# Patient Record
Sex: FEMALE | Race: BLACK OR AFRICAN AMERICAN
[De-identification: names, ages, dates, MRNs, and addresses within clinical notes are randomized per-mention and may not be internally consistent; named-entity substitution may affect disease eponyms.]

---

## 2019-10-24 ENCOUNTER — HOSPITAL ENCOUNTER (INPATIENT)
Dept: HOSPITAL 74 - JLDR | Age: 39
LOS: 3 days | Discharge: HOME | End: 2019-10-27
Attending: OBSTETRICS & GYNECOLOGY | Admitting: OBSTETRICS & GYNECOLOGY
Payer: COMMERCIAL

## 2019-10-24 VITALS — BODY MASS INDEX: 14.6 KG/M2

## 2019-10-24 DIAGNOSIS — O36.63X0: ICD-10-CM

## 2019-10-24 DIAGNOSIS — O40.3XX0: ICD-10-CM

## 2019-10-24 DIAGNOSIS — Z3A.39: ICD-10-CM

## 2019-10-24 DIAGNOSIS — O34.211: Primary | ICD-10-CM

## 2019-10-24 DIAGNOSIS — E66.01: ICD-10-CM

## 2019-10-24 RX ADMIN — IBUPROFEN PRN MG: 800 INJECTION INTRAVENOUS at 22:02

## 2019-10-24 NOTE — HP
Past Medical History





- Admission


Chief Complaint: repeat lt c s


History Source: Patient


Limitations to Obtaining History: No Limitations





- Past Medical History


CNS: No: Alzheimer's, CVA, Dementia, Migraine, Multiple Sclerosis, Peripheral 

Neuropathy, Parkinson's, Seizure, Syncope, TIA, Vertigo, Other


Cardiovascular: No: AFIB, Aneurysm, Aortic Insufficiency, Aortic Stenosis, CAD, 

CHF, Deep Vein Thrombosis, HTN, Hyperlipdemia, MI, Mitral Insufficiency, Mitral 

Stenosis, Murmur, Pulmonary Hypertension, Other


Pulmonary: No: Asthma, Bronchitis, Cancer, COPD, O2 Dependent, Pneumonia, 

Previously Intubated, Pulmonary Embolus, Pulmonary Fibrosis, Sleep Apnea, Other


Gastrointestinal: No: Ascites, Cancer, Constipation, Crohn's Disease, 

Diverticulitis, Diverticulosis, Esophageal Varices, Gastritis, GERD, GI Bleed, 

Hemorrhoids, Hiatal Hernia, Inflamatory Bowel Disease, Irritable Bowel Disease, 

Pancreatitis, Peptic Ulcer Disease, Ulcerative Colitis, Other


Hepatobiliary: No: Cirrhosis, Cholelithiasis, Cholecystitis, Choledocholithiasis

, Hepatitis A, Hepatitis B, Hepatitis C, Other


Renal/: No: Renal Failure, Renal Inusuff, BPH, Cancer, Hematuria, Hemodialysis

, Neurogenic Bladder, Renal Calculi, UTI, Other


Reproductive: No: Ectopic Pregnancy, Endometriosis, Fibroids, PID, Polycystic 

Ovary Syndrome, Postmenopausal, Other


...: 3


...Para: 2


...Term: 2


...: 0


...Spon : 0


...Induced : 0


...Multiple Gestation: 0


...LMP: 19


... Weeks Gestation by Dates: 39


...EDC by Dates: 10/30/19


...EDC by Sono: 10/24/19


Heme/Onc: No: Anemia, B12 Deficiency, Bleeding Disorder, Cancer, Current 

Chemotherapy, Current Radiation Therapy, Hemochromatosis, Hypercoaguable State, 

Myeloproliferative Synd, Sickle Cell Disease, Sickle Cell Trait, 

Thrombocytopenia, Other


Infectious Disease: No: AIDS, C-Diff, Herpes Zoster, HIV, MRSA, STD's, 

Tuberculosis, VREF, Other


Psych: No: Addictions, Anxiety, Bipolar, Depression, Panic, Psychosis, 

Schizophrenia, Other


Musculoskeletal: No: Bursitis, Chronic low back pain, Hemiparesis, Hemiplegia, 

Osteoarthritis, Paraplegia, Other


Rheumatology: No: Fibromyalgia, Gout, Lupus, Rheumatoid Arthritis, Sarcoidosis, 

Vasculitis, Other


ENT: No: Allergic Rhinitis, Sinusitis, Other


Endocrine: No: Mason's Disease, Cushing's Disease, Diabetes Insipidus, 

Diabetes Mellitus, Hyperparathyroidism, Hyperthyroidism, Hypothyroidism, 

Osteopenia, SIADH, Other


Dermatology: No: Basal Cell, Cellulitis, Eczema, Melanoma, Psoriasis, Squamous 

Cell, Other





- Past Surgical History


Past Surgical History: No: None, AAA Repair, AICD, Amputation, Appendectomy, 

Arthrosocopy, AV Fistula/Graft, Bariatric Surgery, Breast Biopsy, Bypass, CABG, 

Carotid Endarterectomy, Cataract Removal, Cholecystectomy, Colectomy, 

Colonoscopy, Colostomy, Craniotomy, , Cystectomy, Hernia Repair, 

Hysterectomy, Ileal Conduit, Ileosotomy, Joint Replacement, Kidney Transplant, 

Laminectomy, Liver Transplant, Mastectomy, Nephrectomy, Oopherectomy, 

Orchiectomy, Permanent Pacemaker, Prostatectomy, Splenectomy, Stent, Thoracotomy

, TURP, Tonsillectomy, Tubal Ligation, Upper Endoscopy, Valve Replacement, 

Vasectomy, Vein Stripping/Ligation


Hx Myomectomy: No


Hx Transabdominal Cerclage: No





- Advance Directives


Advance Directives: Yes: Living Will





- Smoking History


Smoking history: Never smoked


Have you smoked in the past 12 months: No





- Alcohol/Substance Use


Hx Alcohol Use: No


History of Substance Use: reports: None





- Social History


Usual Living Arrangement: Yes: With Spouse


Do you think of yourself as: Straight/Heterosexual


ADL: Independent


History of Recent Travel: No





Home Medications





- Allergies


Allergies/Adverse Reactions: 


 Allergies











Allergy/AdvReac Type Severity Reaction Status Date / Time


 


No Known Allergies Allergy   Verified 10/24/19 08:31














- Home Medications


Home Medications: 


Ambulatory Orders





Albuterol Sulfate [Proventil HFA Inhaler -] 1 - 2 inh PO PRN 10/24/19 


Prenat 115/Iron Fum/Folic/Dss [Prenatal 19 Tablet] 1 each PO DAILY 10/24/19 











Family Medical History


Family History: Denies





Review of Systems





- Review of Systems


Constitutional: reports: No Symptoms


Eyes: reports: No Symptoms


HENT: reports: No Symptoms


Neck: reports: No Symptoms


Cardiovascular: reports: No Symptoms


Respiratory: reports: No Symptoms


Gastrointestinal: reports: No Symptoms


Genitourinary: reports: No Symptoms


Breasts: reports: No Symptoms Reported


Musculoskeletal: reports: No Symptoms


Integumentary: reports: No Symptoms


Neurological: reports: No Symptoms


Endocrine: reports: No Symptoms


Hematology/Lymphatic: reports: No Symptoms


Psychiatric: reports: No Symptoms





Physical Exam - Maternity


Vital Signs: 


 Vital Signs











Temperature  98.7 F   10/24/19 08:34


 


Pulse Rate  103 H  10/24/19 08:34


 


Respiratory Rate  20   10/24/19 08:34


 


Blood Pressure  109/61   10/24/19 08:34


 


O2 Sat by Pulse Oximetry (%)      











Constitutional: Yes: Well Nourished, No Distress, Calm


Eyes: Yes: WNL, Conjunctiva Clear, EOM Intact


HENT: Yes: WNL, Atraumatic, Normocephalic


Neck: Yes: WNL, Supple, Trachea Midline


Cardiovascular: Yes: WNL, Regular Rate and Rhythm


Lungs: Clear to auscultation


Breast(s): Yes: WNL





- Abdominal Exam/OB


Fundal Height: 40


Number of Fetuses: Single


Fetal Presentation: Vertex


Contractions: Yes


Regularity: Irregular


Intensity: Mild


Fetal Monitor Mode: External


Fetal Heart Rate Location: [facility]


Category: I


Accelerations: Uniform


Decelerations: None





- Vaginal Exam/OB


Vaginal Bleediing: No


Speculum Exam: No


Amniotic Membrane Status: Intact


Fetal Presentation: Vertex/Position


Fetal Station: -3





- Physical Exam


Musculoskeletal: Yes: WNL


Extremities: Yes: WNL


Edema: Yes


Edema: LUE: 1+, RUE: 1+, LLE: 1+, RLE: 1+


Integumentary: Yes: WNL


Deep Tendon Reflex Grade: Normal +2


...Motor Strength: WNL


Psychiatric: Yes: WNL, Alert, Oriented





Assessment/Plan





for repeat lt c s

## 2019-10-24 NOTE — OP
Operative Note





- Note:


Operative Date: 10/24/19


Pre-Operative Diagnosis: repeat lt c s


Operation: repeat lt c s


Findings: 





polyhydramnious , macrosomia 


Post-Operative Diagnosis: Same as Pre-op


Surgeon: Migel Zhao


Assistant: Tomasz Santos


Anesthesia: Spinal


Estimated Blood Loss (mls): 1,200


Operative Report Dictated: Yes

## 2019-10-25 LAB
BASOPHILS # BLD: 0.7 % (ref 0–2)
DEPRECATED RDW RBC AUTO: 14.2 % (ref 11.6–15.6)
EOSINOPHIL # BLD: 1.4 % (ref 0–4.5)
HCT VFR BLD CALC: 30.9 % (ref 32.4–45.2)
HGB BLD-MCNC: 10.5 GM/DL (ref 10.7–15.3)
LYMPHOCYTES # BLD: 19.2 % (ref 8–40)
MCH RBC QN AUTO: 30 PG (ref 25.7–33.7)
MCHC RBC AUTO-ENTMCNC: 34 G/DL (ref 32–36)
MCV RBC: 88.4 FL (ref 80–96)
MONOCYTES # BLD AUTO: 8.9 % (ref 3.8–10.2)
NEUTROPHILS # BLD: 69.8 % (ref 42.8–82.8)
PLATELET # BLD AUTO: 158 K/MM3 (ref 134–434)
PMV BLD: 9.6 FL (ref 7.5–11.1)
RBC # BLD AUTO: 3.5 M/MM3 (ref 3.6–5.2)
WBC # BLD AUTO: 10.6 K/MM3 (ref 4–10)

## 2019-10-25 RX ADMIN — IBUPROFEN PRN MG: 600 TABLET, FILM COATED ORAL at 19:24

## 2019-10-25 RX ADMIN — SIMETHICONE CHEW TAB 80 MG PRN MG: 80 TABLET ORAL at 18:23

## 2019-10-25 RX ADMIN — IBUPROFEN PRN MG: 800 INJECTION INTRAVENOUS at 13:39

## 2019-10-25 RX ADMIN — Medication SCH MLS/HR: at 02:04

## 2019-10-25 RX ADMIN — ENOXAPARIN SODIUM SCH MG: 40 INJECTION SUBCUTANEOUS at 09:00

## 2019-10-25 RX ADMIN — IBUPROFEN PRN MG: 800 INJECTION INTRAVENOUS at 06:12

## 2019-10-25 RX ADMIN — Medication SCH MLS/HR: at 02:06

## 2019-10-25 NOTE — PN
Post Partum Progress Note


Post Partum Day: 1


Type of Delivery: Repeat C/S


Vital Signs: 


 Vital Signs











Temperature  98.5 F   10/25/19 10:00


 


Pulse Rate  95 H  10/25/19 10:00


 


Respiratory Rate  20   10/25/19 15:00


 


Blood Pressure  121/56 L  10/25/19 10:00


 


O2 Sat by Pulse Oximetry (%)  98   10/24/19 13:30











Breast Exam: Yes: Soft


Uterus: Yes: Fundus Firm, Fundus below umbilicus


Incision: Yes: Dressing dry and intact, Sutures intact


Abdomen/GI: Yes: Abdomen soft, Passing flatus, Tolerating PO


Lochia: Yes: Serosa


Lochia, amount: Small


Extremities: Yes: Calves non-tender


Perineum: Yes: Intact


Activity: Ambulating





- Labs


Labs: 


 CBC











WBC  10.6 K/mm3 (4.0-10.0)  H  10/25/19  08:42    


 


RBC  3.50 M/mm3 (3.60-5.2)  L  10/25/19  08:42    


 


Hgb  10.5 GM/dL (10.7-15.3)  L  10/25/19  08:42    


 


Hct  30.9 % (32.4-45.2)  L  10/25/19  08:42    


 


MCV  88.4 fl (80-96)   10/25/19  08:42    


 


MCH  30.0 pg (25.7-33.7)   10/25/19  08:42    


 


MCHC  34.0 g/dl (32.0-36.0)   10/25/19  08:42    


 


RDW  14.2 % (11.6-15.6)   10/25/19  08:42    


 


Plt Count  158 K/MM3 (134-434)   10/25/19  08:42    


 


MPV  9.6 fl (7.5-11.1)   10/25/19  08:42    


 


Absolute Neuts (auto)  7.4 K/mm3 (1.5-8.0)   10/25/19  08:42    


 


Neutrophils %  69.8 % (42.8-82.8)   10/25/19  08:42    


 


Lymphocytes %  19.2 % (8-40)  D 10/25/19  08:42    


 


Monocytes %  8.9 % (3.8-10.2)   10/25/19  08:42    


 


Eosinophils %  1.4 % (0-4.5)   10/25/19  08:42    


 


Basophils %  0.7 % (0-2.0)   10/25/19  08:42    


 


Nucleated RBC %  0 % (0-0)   10/25/19  08:42    














Assessment/Plan





doing well, oob as much as possible

## 2019-10-25 NOTE — OP
DATE OF OPERATION:  10/24/2019

 

PREOPERATIVE DIAGNOSIS:  Repeat low transverse  section. 

 

POSTOPERATIVE DIAGNOSIS:  Repeat low transverse  section.

 

PROCEDURE PERFORMED:  Repeat low transverse  section.

 

SURGEON:  Migel Zhao MD

 

ASSISTANT:  HANANE Echeverria 

 

ANESTHESIA:  Spinal.

 

ANESTHESIOLOGIST:  Sherry Samson M.D. 

 

INDICATIONS:  A 39-year-old female patient, 39 weeks' pregnant, is not diabetic.  She

is morbidly obese, with BMI of 44.2, and was taken to the OR repeat low transverse

 section.  The patient had a previous low transverse  section. 

Currently 39 weeks pregnant. 

 

DESCRIPTION OF PROCEDURE:  The patient was placed on the operating table in the

supine position after spinal anesthesia was obtained.  The patient's abdomen and

pelvis were prepped and draped in the usual sterile manner.  Following the same

Pfannenstiel incision, an incision was made through the skin and subcutaneous tissue

until the fascia was nicked in the midline.  The fascial incision was extended

bilaterally.  The intraperitoneal cavity was entered.  No bladder flap was created. 

The low transverse segment of the uterus was entered.

 

The baby was delivered from LOP position.  There was no cord around the neck.  The

baby is macrosomic and difficult to remove outside the uterine cavity, but we got it

out.   

 

The uterus was a little bit lethargic, so Pitocin was given and Methergine was given

for atonic uterus, but the fundus of the uterus was firm after the Methergine was

given.  The uterus was closed in a single layer, good hemostasis, no complications. 

Both ovaries, fallopian tubes and uterus within normal limits.  Peritoneum was

closed.  Fascia was closed.  Skin was closed with a subcuticular suture.  Both

gutters were clean.  Draining clear urine.  Blood loss about 1200 mL.  

 

Transferred to the recovery room in stable condition. 

 

 

MD CAILIN ARCE/[id number]

DD: 10/24/2019 21:43

DT: 10/25/2019 07:00

Job #:  [id number]

## 2019-10-26 RX ADMIN — ACETAMINOPHEN PRN MG: 325 TABLET ORAL at 13:27

## 2019-10-26 RX ADMIN — IBUPROFEN PRN MG: 600 TABLET, FILM COATED ORAL at 13:26

## 2019-10-26 RX ADMIN — IBUPROFEN PRN MG: 600 TABLET, FILM COATED ORAL at 20:22

## 2019-10-26 RX ADMIN — IBUPROFEN PRN MG: 600 TABLET, FILM COATED ORAL at 03:48

## 2019-10-26 RX ADMIN — SIMETHICONE CHEW TAB 80 MG PRN MG: 80 TABLET ORAL at 13:27

## 2019-10-26 RX ADMIN — SIMETHICONE CHEW TAB 80 MG PRN MG: 80 TABLET ORAL at 20:22

## 2019-10-26 RX ADMIN — IBUPROFEN PRN MG: 600 TABLET, FILM COATED ORAL at 09:20

## 2019-10-26 RX ADMIN — ENOXAPARIN SODIUM SCH MG: 40 INJECTION SUBCUTANEOUS at 09:20

## 2019-10-26 RX ADMIN — ACETAMINOPHEN PRN MG: 325 TABLET ORAL at 20:22

## 2019-10-26 RX ADMIN — SIMETHICONE CHEW TAB 80 MG PRN MG: 80 TABLET ORAL at 03:48

## 2019-10-26 RX ADMIN — SIMETHICONE CHEW TAB 80 MG PRN MG: 80 TABLET ORAL at 09:20

## 2019-10-27 VITALS — SYSTOLIC BLOOD PRESSURE: 125 MMHG | HEART RATE: 96 BPM | TEMPERATURE: 99.1 F | DIASTOLIC BLOOD PRESSURE: 69 MMHG

## 2019-10-27 LAB
BASOPHILS # BLD: 0.8 % (ref 0–2)
DEPRECATED RDW RBC AUTO: 14.7 % (ref 11.6–15.6)
EOSINOPHIL # BLD: 2.6 % (ref 0–4.5)
HCT VFR BLD CALC: 26.9 % (ref 32.4–45.2)
HGB BLD-MCNC: 9 GM/DL (ref 10.7–15.3)
LYMPHOCYTES # BLD: 34.6 % (ref 8–40)
MCH RBC QN AUTO: 29.8 PG (ref 25.7–33.7)
MCHC RBC AUTO-ENTMCNC: 33.6 G/DL (ref 32–36)
MCV RBC: 88.8 FL (ref 80–96)
MONOCYTES # BLD AUTO: 10.3 % (ref 3.8–10.2)
NEUTROPHILS # BLD: 51.7 % (ref 42.8–82.8)
PLATELET # BLD AUTO: 167 K/MM3 (ref 134–434)
PMV BLD: 9.8 FL (ref 7.5–11.1)
RBC # BLD AUTO: 3.03 M/MM3 (ref 3.6–5.2)
WBC # BLD AUTO: 5.2 K/MM3 (ref 4–10)

## 2019-10-27 RX ADMIN — SIMETHICONE CHEW TAB 80 MG PRN MG: 80 TABLET ORAL at 09:08

## 2019-10-27 RX ADMIN — ACETAMINOPHEN PRN MG: 325 TABLET ORAL at 09:06

## 2019-10-27 RX ADMIN — IBUPROFEN PRN MG: 600 TABLET, FILM COATED ORAL at 09:07

## 2019-10-27 RX ADMIN — ENOXAPARIN SODIUM SCH MG: 40 INJECTION SUBCUTANEOUS at 09:07

## 2019-10-27 NOTE — PN
Post Partum Progress Note


Post Partum Day: 3


Type of Delivery: Repeat C/S


Vital Signs: 


 Vital Signs











Temperature  99.1 F   10/27/19 10:00


 


Pulse Rate  96 H  10/27/19 10:00


 


Respiratory Rate  20   10/27/19 10:00


 


Blood Pressure  125/69   10/27/19 10:00


 


O2 Sat by Pulse Oximetry (%)  98   10/24/19 13:30











Breast Exam: Yes: Soft


Uterus: Yes: Fundus Firm, Fundus below umbilicus


Incision: Yes: Dressing dry and intact, Sutures intact


Abdomen/GI: Yes: Abdomen soft, Passing flatus, Tolerating PO


Lochia: Yes: Serosa


Lochia, amount: Small


Extremities: Yes: Calves non-tender


Perineum: Yes: Intact


Activity: Ambulating





- Labs


Labs: 


 CBC











WBC  5.2 K/mm3 (4.0-10.0)   10/27/19  07:23    


 


RBC  3.03 M/mm3 (3.60-5.2)  L  10/27/19  07:23    


 


Hgb  9.0 GM/dL (10.7-15.3)  L  10/27/19  07:23    


 


Hct  26.9 % (32.4-45.2)  L  10/27/19  07:23    


 


MCV  88.8 fl (80-96)   10/27/19  07:23    


 


MCH  29.8 pg (25.7-33.7)   10/27/19  07:23    


 


MCHC  33.6 g/dl (32.0-36.0)   10/27/19  07:23    


 


RDW  14.7 % (11.6-15.6)   10/27/19  07:23    


 


Plt Count  167 K/MM3 (134-434)   10/27/19  07:23    


 


MPV  9.8 fl (7.5-11.1)   10/27/19  07:23    


 


Absolute Neuts (auto)  2.7 K/mm3 (1.5-8.0)   10/27/19  07:23    


 


Neutrophils %  51.7 % (42.8-82.8)  D 10/27/19  07:23    


 


Lymphocytes %  34.6 % (8-40)  D 10/27/19  07:23    


 


Monocytes %  10.3 % (3.8-10.2)  H  10/27/19  07:23    


 


Eosinophils %  2.6 % (0-4.5)  D 10/27/19  07:23    


 


Basophils %  0.8 % (0-2.0)   10/27/19  07:23    


 


Nucleated RBC %  0 % (0-0)   10/27/19  07:23    














Assessment/Plan





dc pt home today

## 2019-10-27 NOTE — DS
Physical Exam-GYN


Vital Signs: 


 Vital Signs











Temperature  98.7 F   10/26/19 21:51


 


Pulse Rate  106 H  10/26/19 21:51


 


Respiratory Rate  18   10/26/19 21:51


 


Blood Pressure  117/71   10/26/19 21:51


 


O2 Sat by Pulse Oximetry (%)  98   10/24/19 13:30











Constitutional: Yes: Well Nourished, No Distress, Calm


Eyes: Yes: WNL, Conjunctiva Clear, EOM Intact


HENT: Yes: WNL, Atraumatic, Normocephalic


Neck: Yes: WNL, Supple, Trachea Midline


Cardiovascular: Yes: WNL, Regular Rate and Rhythm


Respiratory: Yes: WNL, Regular, CTA Bilaterally


Gastrointestinal: Yes: WNL, Normal Bowel Sounds, Soft


...Rectal Exam: Yes: WNL


Renal/: Yes: WNL


Pelvis: Yes: WNL


External Genitalia: Yes: Normal


Internal Exam Deferred: No


Vaginal Exam: Yes: Normal


Cervix: Yes: Normal


Uterus: Yes: Normal


Adnexa: Normal: Bilateral


....Post Partum: Yes: Uterus firm, Uterus non-tender


Breast(s): Yes: WNL


Musculoskeletal: Yes: WNL


Extremities: Yes: WNL


Edema: Yes


Edema: LUE: 1+, RUE: 1+, LLE: 1+, RLE: 1+


Integumentary: Yes: WNL


Wound/Incision: Yes: Clean/Dry, Well Approximated


Neurological: Yes: WNL, Alert, Oriented


...Motor Strength: WNL


Psychiatric: Yes: WNL, Alert, Oriented


Labs: 


 CBC, BMP





 10/25/19 08:42 











Delivery





- Delivery


 Section: Repeat


Type of Anesthesia: Spinal


Episiotomy/Laceration: None


EBL (cc): 1,200





Delivery, Single Birth





- Stages of Labor


Date of Delivery: 10/24/19


Time of Delivery: 11:12


Time Placenta Delivered: 11:13





- Condition of Infant


Pediatrician/Neonatologist Present: Yes


Name: Niko Ryder


Infant Gender: Male


Birth Weight: 4.423 kg


Position: Left, OA


Total Hours ROM (Hrs/Mins): 0hrs 2 min





- Apgar


  ** 1 Minute


Apgar Total Score: 9





  ** 5 Minutes


Apgar Total Score: 9





- [facility] Feeding Plan


Initial Plan: Elected not to breastfeed exclusively throughout hospitalization





Discharge Summary


Problems reviewed: Yes


Reason For Visit: C SECTION


Procedures: Principal: repeat lt c s


Condition: Stable





- Instructions


Diet, Activity, Other Instructions: 


regular 


Disposition: HOME





- Home Medications


Comprehensive Discharge Medication List: 


Ambulatory Orders





Albuterol Sulfate [Proventil HFA Inhaler -] 1 - 2 inh PO PRN 10/24/19 


Prenat 115/Iron Fum/Folic/Dss [Prenatal 19 Tablet] 1 each PO DAILY 10/24/19 








Prescription Drug Monitoring Program (I-STOP) results: I-STOP reviewed and no 

issues identified

## 2019-10-27 NOTE — PN
Post Partum Progress Note


Post Partum Day: 2


Type of Delivery: Repeat C/S


Vital Signs: 


 Vital Signs











Temperature  98.7 F   10/26/19 21:51


 


Pulse Rate  106 H  10/26/19 21:51


 


Respiratory Rate  18   10/26/19 21:51


 


Blood Pressure  117/71   10/26/19 21:51


 


O2 Sat by Pulse Oximetry (%)  98   10/24/19 13:30











Breast Exam: Yes: Soft


Uterus: Yes: Fundus Firm, Fundus below umbilicus


Incision: Yes: Dressing dry and intact, Sutures intact


Abdomen/GI: Yes: Abdomen soft, Passing flatus, Tolerating PO


Lochia: Yes: Serosa


Lochia, amount: Small


Extremities: Yes: Calves non-tender


Perineum: Yes: Intact


Activity: Ambulating





- Labs


Labs: 


 CBC











WBC  10.6 K/mm3 (4.0-10.0)  H  10/25/19  08:42    


 


RBC  3.50 M/mm3 (3.60-5.2)  L  10/25/19  08:42    


 


Hgb  10.5 GM/dL (10.7-15.3)  L  10/25/19  08:42    


 


Hct  30.9 % (32.4-45.2)  L  10/25/19  08:42    


 


MCV  88.4 fl (80-96)   10/25/19  08:42    


 


MCH  30.0 pg (25.7-33.7)   10/25/19  08:42    


 


MCHC  34.0 g/dl (32.0-36.0)   10/25/19  08:42    


 


RDW  14.2 % (11.6-15.6)   10/25/19  08:42    


 


Plt Count  158 K/MM3 (134-434)   10/25/19  08:42    


 


MPV  9.6 fl (7.5-11.1)   10/25/19  08:42    


 


Absolute Neuts (auto)  7.4 K/mm3 (1.5-8.0)   10/25/19  08:42    


 


Neutrophils %  69.8 % (42.8-82.8)   10/25/19  08:42    


 


Lymphocytes %  19.2 % (8-40)  D 10/25/19  08:42    


 


Monocytes %  8.9 % (3.8-10.2)   10/25/19  08:42    


 


Eosinophils %  1.4 % (0-4.5)   10/25/19  08:42    


 


Basophils %  0.7 % (0-2.0)   10/25/19  08:42    


 


Nucleated RBC %  0 % (0-0)   10/25/19  08:42    














Assessment/Plan





this is retro note for day 2 , will dc pt home tomorrow

## 2019-11-01 NOTE — PATH
Surgical Pathology Report



Patient Name:  QING XIAO

Accession #:  [id number]

[facility]. Rec. #:  [id number]                                                        

   /Age/Gender:  1980 (Age: 39) / F

Account:  [id number]                                                          

             Location: [facility] OBS/GYN

Taken:  10/24/2019

Received:  10/24/2019

Reported:  2019

Physicians:  Migel Zhao MD

  



Specimen(s) Received

 PLACENTA 





Clinical History

,  ,  -LGA

Cystectomy to back of head , history of asthma







Final Diagnosis

PLACENTA,  SECTION:

719 G THIRD TRIMESTER PLACENTA WITH TRIVASCULAR UMBILICAL CORD AND UNREMARKABLE

PLACENTAL MEMBRANES.





***Electronically Signed***

Sanjuanita Guzman M.D.





Gross Description

The specimen is received fresh labeled placenta and is a 719 gram, 17.5 x 17.0 x

2.7 cm. placenta with attached membranes and umbilical cord.  The attached

membranes are tan, translucent with focal opacities and insert marginally.  The

umbilical cord measures 34 cm. in length and averages 1.2 cm. in diameter.  The

cord inserts eccentrically, 4 cm. to the nearest margin.  No true knots or

strictures are identified. Cut surface of the umbilical cord reveals 3 vessels.

The fetal surface is grey blue with moderate fibrin deposition and appropriate

caliber vessels. The maternal surface is red-brown with focal defects. 

Sectioning reveals red-brown, spongy parenchyma.  No lesions are identified. 

Representative sections are submitted in three cassettes as follows: 1- membrane

rolls and umbilical cord; 2-3- full thickness sections of placenta.

/10/30/2019



[facility]/10/30/2019